# Patient Record
Sex: MALE | Race: WHITE
[De-identification: names, ages, dates, MRNs, and addresses within clinical notes are randomized per-mention and may not be internally consistent; named-entity substitution may affect disease eponyms.]

---

## 2019-04-29 ENCOUNTER — HOSPITAL ENCOUNTER (EMERGENCY)
Dept: HOSPITAL 63 - ER | Age: 34
Discharge: HOME | End: 2019-04-29
Payer: COMMERCIAL

## 2019-04-29 VITALS — HEIGHT: 69 IN | WEIGHT: 315 LBS | BODY MASS INDEX: 46.65 KG/M2

## 2019-04-29 VITALS — DIASTOLIC BLOOD PRESSURE: 66 MMHG | SYSTOLIC BLOOD PRESSURE: 137 MMHG

## 2019-04-29 DIAGNOSIS — E66.01: ICD-10-CM

## 2019-04-29 DIAGNOSIS — G47.30: ICD-10-CM

## 2019-04-29 DIAGNOSIS — Z88.1: ICD-10-CM

## 2019-04-29 DIAGNOSIS — B34.9: Primary | ICD-10-CM

## 2019-04-29 DIAGNOSIS — R74.0: ICD-10-CM

## 2019-04-29 LAB
ALBUMIN SERPL-MCNC: 3.7 G/DL (ref 3.4–5)
ALP SERPL-CCNC: 76 U/L (ref 46–116)
ALT SERPL-CCNC: 87 U/L (ref 16–63)
ANION GAP SERPL CALC-SCNC: 8 MMOL/L (ref 6–14)
APTT PPP: (no result) S
AST SERPL-CCNC: 57 U/L (ref 15–37)
BACTERIA #/AREA URNS HPF: (no result) /HPF
BASOPHILS # BLD AUTO: 0.1 X10^3/UL (ref 0–0.2)
BASOPHILS NFR BLD: 1 % (ref 0–3)
BILIRUB DIRECT SERPL-MCNC: 0.3 MG/DL (ref 0–0.2)
BILIRUB SERPL-MCNC: 1.2 MG/DL (ref 0.2–1)
BILIRUB UR QL STRIP: (no result)
CA-I SERPL ISE-MCNC: 13 MG/DL (ref 8–26)
CALCIUM SERPL-MCNC: 9 MG/DL (ref 8.5–10.1)
CHLORIDE SERPL-SCNC: 103 MMOL/L (ref 98–107)
CO2 SERPL-SCNC: 29 MMOL/L (ref 21–32)
CREAT SERPL-MCNC: 1.1 MG/DL (ref 0.7–1.3)
EOSINOPHIL NFR BLD: 0 X10^3/UL (ref 0–0.7)
EOSINOPHIL NFR BLD: 1 % (ref 0–3)
ERYTHROCYTE [DISTWIDTH] IN BLOOD BY AUTOMATED COUNT: 13.8 % (ref 11.5–14.5)
ERYTHROCYTE [SEDIMENTATION RATE] IN BLOOD: 8 MM/H (ref 0–15)
FIBRINOGEN PPP-MCNC: (no result) MG/DL
GFR SERPLBLD BASED ON 1.73 SQ M-ARVRAT: 76.6 ML/MIN
GLUCOSE SERPL-MCNC: 103 MG/DL (ref 70–99)
GLUCOSE UR STRIP-MCNC: (no result) MG/DL
HCT VFR BLD CALC: 46.7 % (ref 39–53)
HGB BLD-MCNC: 15.9 G/DL (ref 13–17.5)
LYMPHOCYTES # BLD: 1.4 X10^3/UL (ref 1–4.8)
LYMPHOCYTES NFR BLD AUTO: 19 % (ref 24–48)
MCH RBC QN AUTO: 29 PG (ref 25–35)
MCHC RBC AUTO-ENTMCNC: 34 G/DL (ref 31–37)
MCV RBC AUTO: 84 FL (ref 79–100)
MONO #: 0.7 X10^3/UL (ref 0–1.1)
MONOCYTES NFR BLD: 9 % (ref 0–9)
NEUT #: 5 X10^3UL (ref 1.8–7.7)
NEUTROPHILS NFR BLD AUTO: 70 % (ref 31–73)
NITRITE UR QL STRIP: (no result)
PLATELET # BLD AUTO: 193 X10^3/UL (ref 140–400)
POTASSIUM SERPL-SCNC: 4.2 MMOL/L (ref 3.5–5.1)
PROT SERPL-MCNC: 7.9 G/DL (ref 6.4–8.2)
RBC # BLD AUTO: 5.55 X10^6/UL (ref 4.3–5.7)
RBC #/AREA URNS HPF: 0 /HPF (ref 0–2)
SODIUM SERPL-SCNC: 140 MMOL/L (ref 136–145)
SP GR UR STRIP: 1.01
SQUAMOUS #/AREA URNS LPF: (no result) /LPF
UROBILINOGEN UR-MCNC: 0.2 MG/DL
WBC # BLD AUTO: 7.2 X10^3/UL (ref 4–11)
WBC #/AREA URNS HPF: (no result) /HPF (ref 0–4)

## 2019-04-29 PROCEDURE — 71046 X-RAY EXAM CHEST 2 VIEWS: CPT

## 2019-04-29 PROCEDURE — 80048 BASIC METABOLIC PNL TOTAL CA: CPT

## 2019-04-29 PROCEDURE — 85651 RBC SED RATE NONAUTOMATED: CPT

## 2019-04-29 PROCEDURE — 36415 COLL VENOUS BLD VENIPUNCTURE: CPT

## 2019-04-29 PROCEDURE — 99285 EMERGENCY DEPT VISIT HI MDM: CPT

## 2019-04-29 PROCEDURE — 86705 HEP B CORE ANTIBODY IGM: CPT

## 2019-04-29 PROCEDURE — 87070 CULTURE OTHR SPECIMN AEROBIC: CPT

## 2019-04-29 PROCEDURE — 87040 BLOOD CULTURE FOR BACTERIA: CPT

## 2019-04-29 PROCEDURE — 80076 HEPATIC FUNCTION PANEL: CPT

## 2019-04-29 PROCEDURE — 87880 STREP A ASSAY W/OPTIC: CPT

## 2019-04-29 PROCEDURE — 86709 HEPATITIS A IGM ANTIBODY: CPT

## 2019-04-29 PROCEDURE — 87340 HEPATITIS B SURFACE AG IA: CPT

## 2019-04-29 PROCEDURE — 85025 COMPLETE CBC W/AUTO DIFF WBC: CPT

## 2019-04-29 PROCEDURE — 81001 URINALYSIS AUTO W/SCOPE: CPT

## 2019-04-29 PROCEDURE — 86803 HEPATITIS C AB TEST: CPT

## 2019-04-29 NOTE — ED.ADGEN
Past History


Past Medical History:  Other


Past Surgical History:  Other


Alcohol Use:  Rarely


Drug Use:  None





Adult General


Chief Complaint


Chief Complaint


".. I was at the [facility] clinic.. and they checked me out for my fever...they 

said I needed a chest xray since my flu was negative..."





HPI


HPI





Patient is a 33 year old male who presents with above hx and complaints of 

fever.  Patient denies any specific ill contacts. Has been traveling in [place] 

for March 18 to April 17.  Patient normally healthy. Patient does have a history

of sleep apnea and obesity. Reportedly flu swab at many clinic was negative.  No

history of trauma. No history of IV drug use. No history immunosuppression.





Review of Systems


Review of Systems





Constitutional: History of fever


Eyes: Denies change in visual acuity, redness, or eye pain []


HENT: Denies nasal congestion or sore throat []


Respiratory: Denies cough or shortness of breath []


Cardiovascular: No additional information not addressed in HPI []


GI: Denies abdominal pain, nausea, vomiting, bloody stools or diarrhea []


: Denies dysuria or hematuria []


Musculoskeletal: Denies back pain or joint pain []history of myalgia and 

arthralgia


Integument: Denies rash or skin lesions []


Neurologic: Denies headache, focal weakness or sensory changes []


Endocrine: Denies polyuria or polydipsia []





All other systems were reviewed and found to be within normal limits, except as 

documented in this note.





Family History


Family History


Sleep apnea





Current Medications


Current Medications





Current Medications








 Medications


  (Trade)  Dose


 Ordered  Sig/Nicky  Start Time


 Stop Time Status Last Admin


Dose Admin


 


 Acetaminophen


  (Tylenol)  1,000 mg  1X  ONCE  4/29/19 18:45


 4/29/19 18:46 DC 4/29/19 19:30


1,000 MG


 


 Lactated Ringer's  1,000 ml @ 


 0 mls/hr  1X  ONCE  4/29/19 20:01


 4/29/19 20:07 DC 4/29/19 20:08


1,000 MLS/HR











Allergies


Allergies





Allergies








Coded Allergies Type Severity Reaction Last Updated Verified


 


  erythromycin base Allergy Unknown  4/29/19 Yes











Physical Exam


Physical Exam





Constitutional: Mild to moderate distress, non-toxic appearance. []


HENT: Normocephalic, atraumatic, bilateral external ears normal, oropharynx 

moist, no oral exudates, nose swollen turbinates and clear rhinorrhea


Eyes: PERRLA, EOMI, conjunctiva normal, no discharge. [] 


Neck: Normal range of motion, no tenderness, supple, no stridor. [] 


More than 17 inches circumference


Cardiovascular:Heart rate regular rhythm, no murmur []


Lungs & Thorax:  Bilateral breath sounds equal apex with scattered wheezes on 

auscultation []


Abdomen: Bowel sounds normal, soft, no tenderness, no masses, no pulsatile 

masses. [Obese.] 


Skin: Warm, dry, no erythema, no rash. [] 


Back: No tenderness, no CVA tenderness. [] 


Extremities: No tenderness, no cyanosis, no clubbing, ROM intact, no edema. [] 


Neurologic: Alert and oriented X 3, normal motor function, normal sensory 

function, no focal deficits noted. []DTRs +2 brachial heel and patella. Patient 

attempt without problems.


Psychologic: Affect normal, judgement normal, mood normal. []





Current Patient Data


Vital Signs





                                   Vital Signs








  Date Time  Temp Pulse Resp B/P (MAP) Pulse Ox O2 Delivery O2 Flow Rate FiO2


 


4/29/19 21:12 100.3       


 


4/29/19 20:55  86 16 137/66 (89) 96   


 


4/29/19 18:00      Room Air  








Lab Results





                                Laboratory Tests








Test


 4/29/19


19:45 4/29/19


21:00


 


White Blood Count


 7.2 x10^3/uL


(4.0-11.0) 





 


Red Blood Count


 5.55 x10^6/uL


(4.30-5.70) 





 


Hemoglobin


 15.9 g/dL


(13.0-17.5) 





 


Hematocrit


 46.7 %


(39.0-53.0) 





 


Mean Corpuscular Volume


 84 fL ()


 





 


Mean Corpuscular Hemoglobin 29 pg (25-35)   


 


Mean Corpuscular Hemoglobin


Concent 34 g/dL


(31-37) 





 


Red Cell Distribution Width


 13.8 %


(11.5-14.5) 





 


Platelet Count


 193 x10^3/uL


(140-400) 





 


Neutrophils (%) (Auto) 70 % (31-73)   


 


Lymphocytes (%) (Auto) 19 % (24-48)  L 


 


Monocytes (%) (Auto) 9 % (0-9)   


 


Eosinophils (%) (Auto) 1 % (0-3)   


 


Basophils (%) (Auto) 1 % (0-3)   


 


Neutrophils # (Auto)


 5.0 x10^3uL


(1.8-7.7) 





 


Lymphocytes # (Auto)


 1.4 x10^3/uL


(1.0-4.8) 





 


Monocytes # (Auto)


 0.7 x10^3/uL


(0.0-1.1) 





 


Eosinophils # (Auto)


 0.0 x10^3/uL


(0.0-0.7) 





 


Basophils # (Auto)


 0.1 x10^3/uL


(0.0-0.2) 





 


Erythrocyte Sedimentation Rate 8 (0-15)   


 


Sodium Level


 140 mmol/L


(136-145) 





 


Potassium Level


 4.2 mmol/L


(3.5-5.1) 





 


Chloride Level


 103 mmol/L


() 





 


Carbon Dioxide Level


 29 mmol/L


(21-32) 





 


Anion Gap 8 (6-14)   


 


Blood Urea Nitrogen


 13 mg/dL


(8-26) 





 


Creatinine


 1.1 mg/dL


(0.7-1.3) 





 


Estimated GFR


(Cockcroft-Gault) 76.6  


 





 


Glucose Level


 103 mg/dL


(70-99)  H 





 


Calcium Level


 9.0 mg/dL


(8.5-10.1) 





 


Total Bilirubin


 1.2 mg/dL


(0.2-1.0)  H 





 


Direct Bilirubin


 0.3 mg/dL


(0.0-0.2)  H 





 


Aspartate Amino Transferase


(AST) 57 U/L (15-37)


H 





 


Alanine Aminotransferase (ALT)


 87 U/L (16-63)


H 





 


Alkaline Phosphatase


 76 U/L


() 





 


Total Protein


 7.9 g/dL


(6.4-8.2) 





 


Albumin


 3.7 g/dL


(3.4-5.0) 





 


Group A Streptococcus Rapid


 Negative


(NEGATIVE) 





 


Urine Collection Type  Unknown  


 


Urine Color  Rubina  


 


Urine Clarity  Hazy  


 


Urine pH  5.5  


 


Urine Specific Gravity  1.015  


 


Urine Protein


 


 Neg


(NEG-TRACE)


 


Urine Glucose (UA)


 


 Neg mg/dL


(NEG)


 


Urine Ketones (Stick)


 


 Neg mg/dL


(NEG)


 


Urine Blood  Neg (NEG)  


 


Urine Nitrite  Neg (NEG)  


 


Urine Bilirubin  Neg (NEG)  


 


Urine Urobilinogen Dipstick


 


 0.2 mg/dL (0.2


mg/dL)


 


Urine Leukocyte Esterase  Neg (NEG)  


 


Urine RBC  0 /HPF (0-2)  


 


Urine WBC


 


 1-4 /HPF (0-4)





 


Urine Squamous Epithelial


Cells 


 Mod /LPF  





 


Urine Bacteria


 


 Few /HPF


(0-FEW)


 


Urine Mucus  Slight /LPF  











EKG


EKG


[]





Radiology/Procedures


Radiology/Procedures


My interpretation chest x-ray shows no acute cardiopulmonary findings.





Patient deferred CT at this time.[]





Course & Med Decision Making


Course & Med Decision Making


Pertinent Labs and Imaging studies reviewed. (See chart for details)





Patient push fluids. Patient take Tylenol and ibuprofen for discomfort. Patient 

follow-up primary care. Patient return of any concerns.  Patient deferred spinal

 tap at this time. Risk and benefits discussed.





[]





Final Impression


Final Impression


1. Fever[]


2. Viral syndrome


3. Elevated AST and ALT  57/87


4. History of sleep apnea


5. Morbid obesity





Dragon Disclaimer


Dragon Disclaimer


This electronic medical record was generated, in whole or in part, using a voice

 recognition dictation system.





Discharge Summary


Visit Information


Final Diagnosis


Problems


Medical Problems:


(1) Viral syndrome


Status: Acute  











Brief Hospital Course


Allergies





                                    Allergies








Coded Allergies Type Severity Reaction Last Updated Verified


 


  erythromycin base Allergy Unknown  4/29/19 Yes








Vital Signs





Vital Signs








  Date Time  Temp Pulse Resp B/P (MAP) Pulse Ox O2 Delivery O2 Flow Rate FiO2


 


4/29/19 21:12 100.3       


 


4/29/19 20:55  86 16 137/66 (89) 96   


 


4/29/19 18:00      Room Air  








Lab Results





Laboratory Tests








Test


 4/29/19


19:45 4/29/19


21:00


 


White Blood Count


 7.2 x10^3/uL


(4.0-11.0) 





 


Red Blood Count


 5.55 x10^6/uL


(4.30-5.70) 





 


Hemoglobin


 15.9 g/dL


(13.0-17.5) 





 


Hematocrit


 46.7 %


(39.0-53.0) 





 


Mean Corpuscular Volume 84 fL ()  


 


Mean Corpuscular Hemoglobin 29 pg (25-35)  


 


Mean Corpuscular Hemoglobin


Concent 34 g/dL


(31-37) 





 


Red Cell Distribution Width


 13.8 %


(11.5-14.5) 





 


Platelet Count


 193 x10^3/uL


(140-400) 





 


Neutrophils (%) (Auto) 70 % (31-73)  


 


Lymphocytes (%) (Auto) 19 % (24-48)  


 


Monocytes (%) (Auto) 9 % (0-9)  


 


Eosinophils (%) (Auto) 1 % (0-3)  


 


Basophils (%) (Auto) 1 % (0-3)  


 


Neutrophils # (Auto)


 5.0 x10^3uL


(1.8-7.7) 





 


Lymphocytes # (Auto)


 1.4 x10^3/uL


(1.0-4.8) 





 


Monocytes # (Auto)


 0.7 x10^3/uL


(0.0-1.1) 





 


Eosinophils # (Auto)


 0.0 x10^3/uL


(0.0-0.7) 





 


Basophils # (Auto)


 0.1 x10^3/uL


(0.0-0.2) 





 


Erythrocyte Sedimentation Rate 8 (0-15)  


 


Sodium Level


 140 mmol/L


(136-145) 





 


Potassium Level


 4.2 mmol/L


(3.5-5.1) 





 


Chloride Level


 103 mmol/L


() 





 


Carbon Dioxide Level


 29 mmol/L


(21-32) 





 


Anion Gap 8 (6-14)  


 


Blood Urea Nitrogen


 13 mg/dL


(8-26) 





 


Creatinine


 1.1 mg/dL


(0.7-1.3) 





 


Estimated GFR


(Cockcroft-Gault) 76.6 


 





 


Glucose Level


 103 mg/dL


(70-99) 





 


Calcium Level


 9.0 mg/dL


(8.5-10.1) 





 


Total Bilirubin


 1.2 mg/dL


(0.2-1.0) 





 


Direct Bilirubin


 0.3 mg/dL


(0.0-0.2) 





 


Aspartate Amino Transf


(AST/SGOT) 57 U/L (15-37) 


 





 


Alanine Aminotransferase


(ALT/SGPT) 87 U/L (16-63) 


 





 


Alkaline Phosphatase


 76 U/L


() 





 


Total Protein


 7.9 g/dL


(6.4-8.2) 





 


Albumin


 3.7 g/dL


(3.4-5.0) 





 


Group A Streptococcus Rapid


 Negative


(NEGATIVE) 





 


Urine Collection Type  Unknown 


 


Urine Color  Rubina 


 


Urine Clarity  Hazy 


 


Urine pH  5.5 


 


Urine Specific Gravity  1.015 


 


Urine Protein


 


 Neg


(NEG-TRACE)


 


Urine Glucose (UA)


 


 Neg mg/dL


(NEG)


 


Urine Ketones (Stick)


 


 Neg mg/dL


(NEG)


 


Urine Blood  Neg (NEG) 


 


Urine Nitrite  Neg (NEG) 


 


Urine Bilirubin  Neg (NEG) 


 


Urine Urobilinogen Dipstick


 


 0.2 mg/dL (0.2


mg/dL)


 


Urine Leukocyte Esterase  Neg (NEG) 


 


Urine RBC  0 /HPF (0-2) 


 


Urine WBC  1-4 /HPF (0-4) 


 


Urine Squamous Epithelial


Cells 


 Mod /LPF 





 


Urine Bacteria


 


 Few /HPF


(0-FEW)


 


Urine Mucus  Slight /LPF 








Brief Hospital Course


Mr. Beckman  is a 33 old male who presented with hx of fever and malaise.   Suspect

 viral syndrome.





Discharge Information


Condition at Discharge:  Improved, Stable


Disposition/Orders:  D/C to Home


Dischare Medications





Current Medications


Acetaminophen (Tylenol) 1,000 mg 1X  ONCE PO  Last administered on 4/29/19at 

19:30; Admin Dose 1,000 MG;  Start 4/29/19 at 18:45;  Stop 4/29/19 at 18:46;  

Status DC


Lactated Ringer's 1,000 ml @  0 mls/hr 1X  ONCE IV  Last administered on 

4/29/19at 20:08; Admin Dose 1,000 MLS/HR;  Start 4/29/19 at 20:01;  Stop 4/29/19

 at 20:07;  Status DC





Active Scripts


Active


Zofran (Ondansetron Hcl) 8 Mg Tablet 8 Mg PO QIDPRN PRN


Hydrocodone-Ibuprofen 7.5-200  ** (Hydrocodone/Ibuprofen) 1 Each Tablet 1 Tab PO

 PRN Q6HRS PRN








Discharge Summary


Visit Information


Final Diagnosis


Problems


Medical Problems:


(1) Viral syndrome


Status: Acute  











Brief Hospital Course


Allergies





                                    Allergies








Coded Allergies Type Severity Reaction Last Updated Verified


 


  erythromycin base Allergy Unknown  4/29/19 Yes








Vital Signs





Vital Signs








  Date Time  Temp Pulse Resp B/P (MAP) Pulse Ox O2 Delivery O2 Flow Rate FiO2


 


4/29/19 21:12 100.3       


 


4/29/19 20:55  86 16 137/66 (89) 96   


 


4/29/19 18:00      Room Air  








Lab Results





Laboratory Tests








Test


 4/29/19


19:45 4/29/19


21:00


 


White Blood Count


 7.2 x10^3/uL


(4.0-11.0) 





 


Red Blood Count


 5.55 x10^6/uL


(4.30-5.70) 





 


Hemoglobin


 15.9 g/dL


(13.0-17.5) 





 


Hematocrit


 46.7 %


(39.0-53.0) 





 


Mean Corpuscular Volume 84 fL ()  


 


Mean Corpuscular Hemoglobin 29 pg (25-35)  


 


Mean Corpuscular Hemoglobin


Concent 34 g/dL


(31-37) 





 


Red Cell Distribution Width


 13.8 %


(11.5-14.5) 





 


Platelet Count


 193 x10^3/uL


(140-400) 





 


Neutrophils (%) (Auto) 70 % (31-73)  


 


Lymphocytes (%) (Auto) 19 % (24-48)  


 


Monocytes (%) (Auto) 9 % (0-9)  


 


Eosinophils (%) (Auto) 1 % (0-3)  


 


Basophils (%) (Auto) 1 % (0-3)  


 


Neutrophils # (Auto)


 5.0 x10^3uL


(1.8-7.7) 





 


Lymphocytes # (Auto)


 1.4 x10^3/uL


(1.0-4.8) 





 


Monocytes # (Auto)


 0.7 x10^3/uL


(0.0-1.1) 





 


Eosinophils # (Auto)


 0.0 x10^3/uL


(0.0-0.7) 





 


Basophils # (Auto)


 0.1 x10^3/uL


(0.0-0.2) 





 


Erythrocyte Sedimentation Rate 8 (0-15)  


 


Sodium Level


 140 mmol/L


(136-145) 





 


Potassium Level


 4.2 mmol/L


(3.5-5.1) 





 


Chloride Level


 103 mmol/L


() 





 


Carbon Dioxide Level


 29 mmol/L


(21-32) 





 


Anion Gap 8 (6-14)  


 


Blood Urea Nitrogen


 13 mg/dL


(8-26) 





 


Creatinine


 1.1 mg/dL


(0.7-1.3) 





 


Estimated GFR


(Cockcroft-Gault) 76.6 


 





 


Glucose Level


 103 mg/dL


(70-99) 





 


Calcium Level


 9.0 mg/dL


(8.5-10.1) 





 


Total Bilirubin


 1.2 mg/dL


(0.2-1.0) 





 


Direct Bilirubin


 0.3 mg/dL


(0.0-0.2) 





 


Aspartate Amino Transf


(AST/SGOT) 57 U/L (15-37) 


 





 


Alanine Aminotransferase


(ALT/SGPT) 87 U/L (16-63) 


 





 


Alkaline Phosphatase


 76 U/L


() 





 


Total Protein


 7.9 g/dL


(6.4-8.2) 





 


Albumin


 3.7 g/dL


(3.4-5.0) 





 


Group A Streptococcus Rapid


 Negative


(NEGATIVE) 





 


Urine Collection Type  Unknown 


 


Urine Color  Rubina 


 


Urine Clarity  Hazy 


 


Urine pH  5.5 


 


Urine Specific Gravity  1.015 


 


Urine Protein


 


 Neg


(NEG-TRACE)


 


Urine Glucose (UA)


 


 Neg mg/dL


(NEG)


 


Urine Ketones (Stick)


 


 Neg mg/dL


(NEG)


 


Urine Blood  Neg (NEG) 


 


Urine Nitrite  Neg (NEG) 


 


Urine Bilirubin  Neg (NEG) 


 


Urine Urobilinogen Dipstick


 


 0.2 mg/dL (0.2


mg/dL)


 


Urine Leukocyte Esterase  Neg (NEG) 


 


Urine RBC  0 /HPF (0-2) 


 


Urine WBC  1-4 /HPF (0-4) 


 


Urine Squamous Epithelial


Cells 


 Mod /LPF 





 


Urine Bacteria


 


 Few /HPF


(0-FEW)


 


Urine Mucus  Slight /LPF 








Brief Hospital Course


Mr. Beckman  is a 33 old male who presented with hx fever.  Suspect viral syndrome.





Discharge Information


Condition at Discharge:  Improved, Stable


Disposition/Orders:  D/C to Home


Dischare Medications





Current Medications


Acetaminophen (Tylenol) 1,000 mg 1X  ONCE PO  Last administered on 4/29/19at 

19:30; Admin Dose 1,000 MG;  Start 4/29/19 at 18:45;  Stop 4/29/19 at 18:46;  

Status DC


Lactated Ringer's 1,000 ml @  0 mls/hr 1X  ONCE IV  Last administered on 

4/29/19at 20:08; Admin Dose 1,000 MLS/HR;  Start 4/29/19 at 20:01;  Stop 4/29/19

 at 20:07;  Status DC





Active Scripts


Active


Zofran (Ondansetron Hcl) 8 Mg Tablet 8 Mg PO QIDPRN PRN


Hydrocodone-Ibuprofen 7.5-200  ** (Hydrocodone/Ibuprofen) 1 Each Tablet 1 Tab PO

 PRN Q6HRS PRN








Dragon Disclaimer


This chart was dictated in whole or in part using Voice Recognition software in 

a busy, high-work load, and often noisy Emergency Department environment.  It 

may contain unintended and wholly unrecognized errors or omissions.





Dragon Disclaimer


This chart was dictated in whole or in part using Voice Recognition software in 

a busy, high-work load, and often noisy Emergency Department environment.  It 

may contain unintended and wholly unrecognized errors or omissions.











PRATIMA DOBSON MD           Apr 29, 2019 18:27

## 2019-04-29 NOTE — RAD
CHEST PA   LATERAL

 

History: Fever, chills.

 

Heart size is not enlarged. No evidence of pneumothorax. No pleural 

effusion. No focal airspace consolidation. Regional skeleton appears 

intact.

 

IMPRESSION: No evidence of consolidating infiltrate.

 

Electronically signed by: Mickey Morales MD (4/29/2019 7:08 PM) [facility]